# Patient Record
Sex: FEMALE | Race: WHITE | NOT HISPANIC OR LATINO | Employment: OTHER
[De-identification: names, ages, dates, MRNs, and addresses within clinical notes are randomized per-mention and may not be internally consistent; named-entity substitution may affect disease eponyms.]

---

## 2018-08-10 ENCOUNTER — HOSPITAL (OUTPATIENT)
Dept: OTHER | Age: 63
End: 2018-08-10
Attending: EMERGENCY MEDICINE

## 2018-08-10 LAB
AMORPH SED URNS QL MICRO: ABNORMAL
ANALYZER ANC (IANC): ABNORMAL
ANION GAP SERPL CALC-SCNC: 9 MMOL/L (ref 10–20)
APPEARANCE UR: ABNORMAL
BASOPHILS # BLD: 0 THOUSAND/MCL (ref 0–0.3)
BASOPHILS NFR BLD: 0 %
BILIRUB UR QL: NEGATIVE
BUN SERPL-MCNC: 25 MG/DL (ref 6–20)
BUN/CREAT SERPL: 42 (ref 7–25)
CALCIUM SERPL-MCNC: 8.9 MG/DL (ref 8.4–10.2)
CAOX CRY URNS QL MICRO: ABNORMAL
CHLORIDE: 106 MMOL/L (ref 98–107)
CK SERPL-CCNC: 71 UNIT/L (ref 26–192)
CO2 SERPL-SCNC: 27 MMOL/L (ref 21–32)
COLOR UR: YELLOW
CREAT SERPL-MCNC: 0.59 MG/DL (ref 0.51–0.95)
DIFFERENTIAL METHOD BLD: ABNORMAL
EOSINOPHIL # BLD: 0 THOUSAND/MCL (ref 0.1–0.5)
EOSINOPHIL NFR BLD: 0 %
EPITH CASTS #/AREA URNS LPF: ABNORMAL /[LPF]
ERYTHROCYTE [DISTWIDTH] IN BLOOD: 12.4 % (ref 11–15)
FATTY CASTS #/AREA URNS LPF: ABNORMAL /[LPF]
GLUCOSE SERPL-MCNC: 122 MG/DL (ref 65–99)
GLUCOSE UR-MCNC: NEGATIVE MG/DL
GRAN CASTS #/AREA URNS LPF: ABNORMAL /[LPF]
HEMATOCRIT: 39.2 % (ref 36–46.5)
HGB BLD-MCNC: 13.7 GM/DL (ref 12–15.5)
HGB UR QL: NEGATIVE
HYALINE CASTS #/AREA URNS LPF: ABNORMAL /[LPF]
KETONES UR-MCNC: 80 MG/DL
LEUKOCYTE ESTERASE UR QL STRIP: NEGATIVE
LYMPHOCYTES # BLD: 1 THOUSAND/MCL (ref 1–4)
LYMPHOCYTES NFR BLD: 14 %
MCH RBC QN AUTO: 30.9 PG (ref 26–34)
MCHC RBC AUTO-ENTMCNC: 34.9 GM/DL (ref 32–36.5)
MCV RBC AUTO: 88.5 FL (ref 78–100)
MICROSCOPIC (MT): ABNORMAL
MIXED CELL CASTS #/AREA URNS LPF: ABNORMAL /[LPF]
MONOCYTES # BLD: 0.1 THOUSAND/MCL (ref 0.3–0.9)
MONOCYTES NFR BLD: 2 %
MUCOUS THREADS URNS QL MICRO: ABNORMAL
NEUTROPHILS # BLD: 5.7 THOUSAND/MCL (ref 1.8–7.7)
NEUTROPHILS NFR BLD: 84 %
NEUTS SEG NFR BLD: ABNORMAL %
NITRITE UR QL: NEGATIVE
NRBC (NRBCRE): ABNORMAL
PH UR: 7 UNIT (ref 5–7)
PLATELET # BLD: 189 THOUSAND/MCL (ref 140–450)
POTASSIUM SERPL-SCNC: 3.9 MMOL/L (ref 3.4–5.1)
PROT UR QL: NEGATIVE MG/DL
RBC # BLD: 4.43 MILLION/MCL (ref 4–5.2)
RBC CASTS #/AREA URNS LPF: ABNORMAL /[LPF]
RENAL EPI CELLS #/AREA URNS HPF: ABNORMAL /[HPF]
SODIUM SERPL-SCNC: 138 MMOL/L (ref 135–145)
SP GR UR: 1.02 (ref 1–1.03)
SPECIMEN SOURCE: ABNORMAL
SPERM URNS QL MICRO: ABNORMAL
T VAGINALIS URNS QL MICRO: ABNORMAL
TRI-PHOS CRY URNS QL MICRO: ABNORMAL
URATE CRY URNS QL MICRO: ABNORMAL
URNS CMNT MICRO: ABNORMAL
UROBILINOGEN UR QL: 0.2 MG/DL (ref 0–1)
WAXY CASTS #/AREA URNS LPF: ABNORMAL /[LPF]
WBC # BLD: 6.8 THOUSAND/MCL (ref 4.2–11)
WBC CASTS #/AREA URNS LPF: ABNORMAL /[LPF]
YEAST HYPHAE URNS QL MICRO: ABNORMAL
YEAST URNS QL MICRO: ABNORMAL

## 2019-02-16 ENCOUNTER — HOSPITAL (OUTPATIENT)
Dept: OTHER | Age: 64
End: 2019-02-16

## 2019-06-18 ENCOUNTER — HOSPITAL (OUTPATIENT)
Dept: OTHER | Age: 64
End: 2019-06-18
Attending: NEUROLOGICAL SURGERY

## 2019-10-02 ENCOUNTER — HOSPITAL (OUTPATIENT)
Dept: OTHER | Age: 64
End: 2019-10-02
Attending: NEUROLOGICAL SURGERY

## 2019-10-14 ENCOUNTER — HOSPITAL ENCOUNTER (OUTPATIENT)
Dept: REHABILITATION | Age: 64
Discharge: STILL ADMITTED OR ADMINISTRATIVE | End: 2019-10-14
Attending: NEUROLOGICAL SURGERY

## 2019-10-14 DIAGNOSIS — M54.50 LUMBAGO: Primary | ICD-10-CM

## 2019-10-14 DIAGNOSIS — M54.50 ACUTE BILATERAL LOW BACK PAIN WITHOUT SCIATICA: ICD-10-CM

## 2019-10-14 PROCEDURE — 97110 THERAPEUTIC EXERCISES: CPT | Performed by: PHYSICAL THERAPIST

## 2019-10-14 PROCEDURE — 97140 MANUAL THERAPY 1/> REGIONS: CPT | Performed by: PHYSICAL THERAPIST

## 2019-10-21 ENCOUNTER — APPOINTMENT (OUTPATIENT)
Dept: REHABILITATION | Age: 64
End: 2019-10-21

## 2019-11-04 ENCOUNTER — TELEPHONE (OUTPATIENT)
Dept: REHABILITATION | Age: 64
End: 2019-11-04

## 2020-01-23 DIAGNOSIS — M54.50 LOW BACK PAIN: Primary | ICD-10-CM

## 2020-02-25 ENCOUNTER — HOSPITAL ENCOUNTER (OUTPATIENT)
Dept: REHABILITATION | Age: 65
Discharge: HOME | End: 2020-02-25
Attending: NEUROLOGICAL SURGERY

## 2020-03-06 ENCOUNTER — WALK IN (OUTPATIENT)
Dept: URGENT CARE | Age: 65
End: 2020-03-06

## 2020-03-06 VITALS
HEIGHT: 64 IN | SYSTOLIC BLOOD PRESSURE: 118 MMHG | BODY MASS INDEX: 18.27 KG/M2 | TEMPERATURE: 98.3 F | HEART RATE: 64 BPM | WEIGHT: 107 LBS | RESPIRATION RATE: 15 BRPM | DIASTOLIC BLOOD PRESSURE: 80 MMHG

## 2020-03-06 DIAGNOSIS — H60.501 ACUTE OTITIS EXTERNA OF RIGHT EAR, UNSPECIFIED TYPE: ICD-10-CM

## 2020-03-06 DIAGNOSIS — H61.21 HEARING LOSS OF RIGHT EAR DUE TO CERUMEN IMPACTION: Primary | ICD-10-CM

## 2020-03-06 PROCEDURE — 69209 REMOVE IMPACTED EAR WAX UNI: CPT | Performed by: NURSE PRACTITIONER

## 2020-03-06 RX ORDER — TRAMADOL HYDROCHLORIDE 50 MG/1
TABLET ORAL
Refills: 1 | COMMUNITY
Start: 2020-03-02

## 2020-03-06 RX ORDER — CIPROFLOXACIN AND DEXAMETHASONE 3; 1 MG/ML; MG/ML
4 SUSPENSION/ DROPS AURICULAR (OTIC) 2 TIMES DAILY
Qty: 7.5 ML | Refills: 0 | Status: SHIPPED | OUTPATIENT
Start: 2020-03-06 | End: 2020-03-13

## 2020-03-06 RX ORDER — ALPRAZOLAM 0.25 MG/1
0.25 TABLET ORAL 2 TIMES DAILY PRN
COMMUNITY
Start: 2020-02-03

## 2020-03-07 ENCOUNTER — WALK IN (OUTPATIENT)
Dept: URGENT CARE | Age: 65
End: 2020-03-07
Attending: EMERGENCY MEDICINE

## 2020-03-07 DIAGNOSIS — H61.21 IMPACTED CERUMEN OF RIGHT EAR: Primary | ICD-10-CM

## 2020-03-07 DIAGNOSIS — H60.331 ACUTE SWIMMER'S EAR OF RIGHT SIDE: ICD-10-CM

## 2020-03-07 PROCEDURE — 69209 REMOVE IMPACTED EAR WAX UNI: CPT

## 2020-03-07 PROCEDURE — 99212 OFFICE O/P EST SF 10 MIN: CPT

## 2020-03-07 RX ORDER — MELATONIN
1000 DAILY
COMMUNITY
Start: 2018-03-27

## 2020-03-07 RX ORDER — MULTIVITAMIN WITH FOLIC ACID 400 MCG
1 TABLET ORAL DAILY
COMMUNITY
Start: 2018-03-27

## 2020-03-07 ASSESSMENT — PAIN SCALES - GENERAL
PAINLEVEL: 3-4
PAINLEVEL: 3-4

## 2021-05-26 VITALS
HEART RATE: 64 BPM | OXYGEN SATURATION: 98 % | SYSTOLIC BLOOD PRESSURE: 126 MMHG | TEMPERATURE: 98.1 F | HEIGHT: 64 IN | DIASTOLIC BLOOD PRESSURE: 77 MMHG | RESPIRATION RATE: 16 BRPM | WEIGHT: 100 LBS | BODY MASS INDEX: 17.07 KG/M2

## 2022-12-29 ENCOUNTER — OFFICE VISIT (OUTPATIENT)
Dept: INTEGRATIVE MEDICINE | Facility: CLINIC | Age: 67
End: 2022-12-29
Payer: COMMERCIAL

## 2022-12-29 VITALS
WEIGHT: 108.81 LBS | DIASTOLIC BLOOD PRESSURE: 80 MMHG | HEART RATE: 69 BPM | OXYGEN SATURATION: 98 % | SYSTOLIC BLOOD PRESSURE: 150 MMHG

## 2022-12-29 DIAGNOSIS — M25.60 JOINT STIFFNESS: ICD-10-CM

## 2022-12-29 DIAGNOSIS — R25.2 LEG CRAMPS: Primary | ICD-10-CM

## 2022-12-29 DIAGNOSIS — E87.5 HYPERKALEMIA: ICD-10-CM

## 2022-12-29 DIAGNOSIS — M62.838 MUSCLE SPASM: ICD-10-CM

## 2022-12-29 PROCEDURE — 3077F SYST BP >= 140 MM HG: CPT | Performed by: FAMILY MEDICINE

## 2022-12-29 PROCEDURE — 3079F DIAST BP 80-89 MM HG: CPT | Performed by: FAMILY MEDICINE

## 2022-12-29 PROCEDURE — 99204 OFFICE O/P NEW MOD 45 MIN: CPT | Performed by: FAMILY MEDICINE

## 2022-12-29 RX ORDER — TIZANIDINE 4 MG/1
4 TABLET ORAL NIGHTLY
COMMUNITY
Start: 2022-10-05

## 2022-12-29 RX ORDER — ALPRAZOLAM 0.25 MG/1
0.25 TABLET ORAL
COMMUNITY
Start: 2020-02-03

## 2022-12-29 RX ORDER — TRAMADOL HYDROCHLORIDE 50 MG/1
2 TABLET ORAL 3 TIMES DAILY
COMMUNITY
Start: 2020-03-02

## 2023-01-06 ENCOUNTER — PATIENT MESSAGE (OUTPATIENT)
Dept: INTEGRATIVE MEDICINE | Facility: CLINIC | Age: 68
End: 2023-01-06

## 2023-01-06 NOTE — TELEPHONE ENCOUNTER
From: Norberto Manley  To: Domingo Cm DO  Sent: 1/6/2023 12:25 PM CST  Subject: High Blood Pressure    Hi! Happy Weekend! Before my Prolia injection on 1/5, the nurse took my blood pressure. The following are the 3 readings: 189/90, 189/90, 159/80. The nurse asked me if I was nervous or anxious; I felt fine. Today, 1/6, I took my blood pressure at home. It registered: 160/90, 168/87, 168/84. I've been monitoring my blood pressure over the last 6 months. It's usually 142/90 - 174/80. Should I be on medication for high blood pressure? Suggestions!! Thank you for your time and guidance!    Kind Regards,  Valencia Galvin

## 2023-01-06 NOTE — TELEPHONE ENCOUNTER
RN called and spoke to pt. Pt denies cp, shortness of breath, swelling in her extremities, or other urgent symptoms. Notified pt of Dr. Moon Panda recommendations below. Pt instructed to seek care at the ED for chest pain, SOB, dizziness, high/persisitent fever, or any other urgent symptoms.

## 2023-01-06 NOTE — TELEPHONE ENCOUNTER
Please advise patient    Her blood pressure goal is <150/90. Its seems her averages are above a systolic of 888. Have her continue to keep blood pressure logs and ensure she has labs preformed. We can discuss the best medication to start at follow up visit. If she has any chest, sob, daugherty, lower ext swelling, headaches or vision changes then advise ed visit.

## 2023-01-25 ENCOUNTER — PATIENT MESSAGE (OUTPATIENT)
Dept: INTEGRATIVE MEDICINE | Facility: CLINIC | Age: 68
End: 2023-01-25

## 2023-01-25 NOTE — TELEPHONE ENCOUNTER
From: Noah Betancourt  To: Tasneem Grier, DO  Sent: 1/25/2023 8:59 AM CST  Subject: Any additional testing? Hi Dr. Kellee Miller and RN Kerry Burkitt:  I received the results from [address] [place] [address] regarding the two tests that Dr. Kellee Miller ordered. The test results indicated normal range and negative. I think, therefore, I am not dealing with an autoimmune issue. I think? Soooooo, anything further you'd like me to do? Do we need to investigate the reason for the low white count?   Thank You very much!!!  Kind Regards,  Ashanti Galloway

## 2023-02-07 ENCOUNTER — MED REC SCAN ONLY (OUTPATIENT)
Dept: INTEGRATIVE MEDICINE | Facility: CLINIC | Age: 68
End: 2023-02-07

## 2023-03-28 ENCOUNTER — MED REC SCAN ONLY (OUTPATIENT)
Dept: INTEGRATIVE MEDICINE | Facility: CLINIC | Age: 68
End: 2023-03-28

## 2023-05-22 ENCOUNTER — MED REC SCAN ONLY (OUTPATIENT)
Dept: INTEGRATIVE MEDICINE | Facility: CLINIC | Age: 68
End: 2023-05-22

## 2023-06-06 ENCOUNTER — OFFICE VISIT (OUTPATIENT)
Dept: INTEGRATIVE MEDICINE | Facility: CLINIC | Age: 68
End: 2023-06-06
Payer: MEDICARE

## 2023-06-06 VITALS
DIASTOLIC BLOOD PRESSURE: 80 MMHG | SYSTOLIC BLOOD PRESSURE: 150 MMHG | OXYGEN SATURATION: 100 % | HEART RATE: 61 BPM | WEIGHT: 100 LBS

## 2023-06-06 DIAGNOSIS — R25.2 LEG CRAMPS: ICD-10-CM

## 2023-06-06 DIAGNOSIS — Z12.11 ENCOUNTER FOR SCREENING COLONOSCOPY: Primary | ICD-10-CM

## 2023-06-06 DIAGNOSIS — Z12.31 SCREENING MAMMOGRAM, ENCOUNTER FOR: ICD-10-CM

## 2023-06-06 DIAGNOSIS — R53.83 OTHER FATIGUE: ICD-10-CM

## 2023-06-06 DIAGNOSIS — E87.5 HYPERKALEMIA: ICD-10-CM

## 2023-06-06 DIAGNOSIS — M62.838 MUSCLE SPASM: ICD-10-CM

## 2023-06-06 PROCEDURE — 3079F DIAST BP 80-89 MM HG: CPT | Performed by: FAMILY MEDICINE

## 2023-06-06 PROCEDURE — 99214 OFFICE O/P EST MOD 30 MIN: CPT | Performed by: FAMILY MEDICINE

## 2023-06-06 PROCEDURE — 3077F SYST BP >= 140 MM HG: CPT | Performed by: FAMILY MEDICINE

## 2023-06-06 RX ORDER — BACLOFEN 10 MG/1
1 TABLET ORAL 3 TIMES DAILY
COMMUNITY
Start: 2023-05-30

## 2023-08-01 ENCOUNTER — TELEPHONE (OUTPATIENT)
Dept: FAMILY MEDICINE CLINIC | Facility: CLINIC | Age: 68
End: 2023-08-01

## 2023-08-01 NOTE — TELEPHONE ENCOUNTER
Received voice mail message from the pt she needs her CJC/Impamorelin peptide prescription filled. 7/18/23 [facility]/Impamorelin faxed to [phone] [facility].

## 2023-09-25 ENCOUNTER — PATIENT MESSAGE (OUTPATIENT)
Dept: INTEGRATIVE MEDICINE | Facility: CLINIC | Age: 68
End: 2023-09-25

## 2023-09-25 NOTE — TELEPHONE ENCOUNTER
A refill request was received for:  Requested Prescriptions     Pending Prescriptions Disp Refills    CUSTOM MEDICATION 1 each 1     Sig: CJC/Impamorelin 4mg/4mg vial   Inject 100 mcg/100 mcg subcutaneous 5 of 7 days per week on empty stomach    Insulin Syringes with needle (0.5 ml/29 gauge/0.5 inches)  Use 1 syringe/needle daily for 30 days   Disp: 30 or 1 package     Last refill date:  8/1/23   Qty: 1 each and 1  Last office visit: 6/6/23   When is follow up due: 6/6/23        Future Appointments   Date Time Provider Birdie Cavanaugh   11/14/2023 11:30 AM [address] [place] [address], [facility] Riccardo [facility]

## 2023-09-25 NOTE — TELEPHONE ENCOUNTER
From: Radha Suazo  To: Xavi French [facility]  Sent: 9/25/2023 9:28 AM CDT  Subject: CJC Injection    Hello! On my refill page I didn't see the [address] injection listed. Per our last conversation, I no longer use the [facility]. I would like to refill the [address] and will need the syringes as well. Thank you for your time and assistance!   Kind Regards,  Power Waters

## 2023-11-14 ENCOUNTER — OFFICE VISIT (OUTPATIENT)
Dept: INTEGRATIVE MEDICINE | Facility: CLINIC | Age: 68
End: 2023-11-14
Payer: MEDICARE

## 2023-11-14 VITALS
OXYGEN SATURATION: 99 % | HEART RATE: 68 BPM | WEIGHT: 100 LBS | SYSTOLIC BLOOD PRESSURE: 144 MMHG | DIASTOLIC BLOOD PRESSURE: 80 MMHG

## 2023-11-14 DIAGNOSIS — R25.2 LEG CRAMPS: ICD-10-CM

## 2023-11-14 DIAGNOSIS — I10 HYPERTENSION, UNSPECIFIED TYPE: ICD-10-CM

## 2023-11-14 DIAGNOSIS — M62.838 MUSCLE SPASM: ICD-10-CM

## 2023-11-14 DIAGNOSIS — E87.5 HYPERKALEMIA: Primary | ICD-10-CM

## 2023-11-14 PROBLEM — R92.2 DENSE BREASTS: Status: ACTIVE | Noted: 2018-01-16

## 2023-11-14 PROBLEM — M79.7 FIBROMYALGIA: Status: ACTIVE | Noted: 2023-11-14

## 2023-11-14 PROBLEM — N30.10 CHRONIC INTERSTITIAL CYSTITIS: Status: ACTIVE | Noted: 2017-03-20

## 2023-11-14 PROBLEM — R92.30 DENSE BREASTS: Status: ACTIVE | Noted: 2018-01-16

## 2023-11-14 PROBLEM — F41.1 GAD (GENERALIZED ANXIETY DISORDER): Status: ACTIVE | Noted: 2019-04-09

## 2023-11-14 PROBLEM — M79.7 RHEUMATISM AND FIBROSITIS: Status: ACTIVE | Noted: 2019-04-16

## 2023-11-14 PROBLEM — E78.49 OTHER HYPERLIPIDEMIA: Status: ACTIVE | Noted: 2021-07-30

## 2023-11-14 PROBLEM — M81.0 AGE-RELATED OSTEOPOROSIS WITHOUT CURRENT PATHOLOGICAL FRACTURE: Status: ACTIVE | Noted: 2019-05-14

## 2023-11-14 PROBLEM — F41.0 PANIC ATTACKS: Status: ACTIVE | Noted: 2021-07-30

## 2023-11-14 PROBLEM — R94.131: Status: ACTIVE | Noted: 2019-10-07

## 2023-11-14 PROBLEM — K25.7 CHRONIC GASTRIC ULCER WITHOUT HEMORRHAGE OR PERFORATION: Status: ACTIVE | Noted: 2019-04-16

## 2023-11-14 PROBLEM — K21.9 ESOPHAGEAL REFLUX: Status: ACTIVE | Noted: 2019-02-26

## 2023-11-14 PROBLEM — M79.0 RHEUMATISM AND FIBROSITIS: Status: ACTIVE | Noted: 2019-04-16

## 2023-11-14 PROBLEM — M50.30 DEGENERATIVE CERVICAL DISC: Status: ACTIVE | Noted: 2019-04-16

## 2023-11-14 PROBLEM — K40.90 NON-RECURRENT UNILATERAL INGUINAL HERNIA WITHOUT OBSTRUCTION OR GANGRENE: Status: ACTIVE | Noted: 2018-10-02

## 2023-11-14 PROBLEM — H43.811 PVD (POSTERIOR VITREOUS DETACHMENT), RIGHT: Status: ACTIVE | Noted: 2018-03-19

## 2023-11-14 PROBLEM — M25.561 ACUTE PAIN OF RIGHT KNEE: Status: ACTIVE | Noted: 2017-03-27

## 2023-11-14 PROCEDURE — 99214 OFFICE O/P EST MOD 30 MIN: CPT | Performed by: FAMILY MEDICINE

## 2023-11-14 RX ORDER — AMLODIPINE BESYLATE 5 MG/1
5 TABLET ORAL DAILY
Qty: 30 TABLET | Refills: 1 | Status: SHIPPED | OUTPATIENT
Start: 2023-11-14

## 2024-03-28 ENCOUNTER — TELEPHONE (OUTPATIENT)
Dept: FAMILY MEDICINE CLINIC | Facility: CLINIC | Age: 69
End: 2024-03-28

## 2024-04-01 ENCOUNTER — TELEPHONE (OUTPATIENT)
Dept: FAMILY MEDICINE CLINIC | Facility: CLINIC | Age: 69
End: 2024-04-01

## 2024-07-18 ENCOUNTER — PATIENT OUTREACH (OUTPATIENT)
Dept: CASE MANAGEMENT | Age: 69
End: 2024-07-18

## 2024-07-18 NOTE — PROCEDURES
The office order for PCP removal request is Approved and finalized on July 18, 2024.    Removed Eddie Parisi MD as the patient's Primary Care Physician